# Patient Record
Sex: FEMALE | Race: WHITE | URBAN - METROPOLITAN AREA
[De-identification: names, ages, dates, MRNs, and addresses within clinical notes are randomized per-mention and may not be internally consistent; named-entity substitution may affect disease eponyms.]

---

## 2020-06-24 ENCOUNTER — TRANSFERRED RECORDS (OUTPATIENT)
Dept: HEALTH INFORMATION MANAGEMENT | Facility: CLINIC | Age: 60
End: 2020-06-24

## 2020-08-07 ENCOUNTER — VIRTUAL VISIT (OUTPATIENT)
Dept: FAMILY MEDICINE | Facility: OTHER | Age: 60
End: 2020-08-07

## 2020-08-07 NOTE — PROGRESS NOTES
"Date: 2020 08:37:52  Clinician: Azar Grimm  Clinician NPI: [id number]  Patient: Richard Stanley  Patient : 1960  Patient Address: [address] [place] [id number]  Patient Phone: [phone]  Visit Protocol: URI  Patient Summary:  Richard is a 60 year old ( : 1960 ) female who initiated a Visit for COVID-19 (Coronavirus) evaluation and screening. When asked the question \"Please sign me up to receive news, health information and promotions. \", Richard responded \"No\".    When asked when her symptoms started, Richard reported that she does not have any symptoms.   She denies taking antibiotic medication in the past month and having recent facial or sinus surgery in the past 60 days.    Pertinent COVID-19 (Coronavirus) information  In the past 14 days, Richard has not worked in a congregate living setting.   She does not work or volunteer as healthcare worker or a  and does not work or volunteer in a healthcare facility.   Richard also has not lived in a congregate living setting in the past 14 days. She does not live with a healthcare worker.   Richard has had a close contact with a laboratory-confirmed COVID-19 patient in the last 14 days. She was not exposed at her work. Additional information about contact with COVID-19 (Coronavirus) patient as reported by the patient (free text): Visit with an elderly relative in hospice care in [place] last week.  She was tested the day we visited and we found out yesterday that she tested positive for covid.    Patient reported they are not living in the same household with a COVID-19 positive patient.  Patient was in an enclosed space for greater than 15 minutes with a COVID-19 patient.  Since 2019, Richard and has not had upper respiratory infection or influenza-like illness. Has not been diagnosed with lab-confirmed COVID-19 test   Pertinent medical history  Richard does not get yeast infections when she takes antibiotics.   Richard does not need a " return to work/school note.   Weight: 149 lbs   Richard does not smoke or use smokeless tobacco.   Weight: 149 lbs    MEDICATIONS: No current medications, ALLERGIES: NKDA  Clinician Response:  Dear Richard,   Based on the details you've shared, you may have been exposed to coronavirus (COVID-19). You do not need to be tested at this time.  What should I do?  For safety, it's very important to follow these rules. Do this for 14 days after the date you were last exposed to the virus.  Stay home and away from others. Don't go to work, school or anywhere else.  No hugging, kissing or shaking hands.  Don't let anyone visit.  Cover your mouth and nose with a mask, tissue or washcloth to avoid spreading germs.  Wash your hands and face often. Use soap and water.  What are the symptoms of COVID-19?  The most common symptoms are cough, fever and trouble breathing. Less common symptoms include headache, body aches, fatigue (feeling very tired), chills, sore throat, stuffy or runny nose, diarrhea (loose poop), loss of taste or smell, belly pain, and nausea or vomiting (feeling sick to your stomach or throwing up).  After 14 days, if you have still don't have symptoms, you likely don't have this virus.  If you develop symptoms, follow these guidelines.  If you're normally healthy: Please start another [facility] visit to report your symptoms. Go to [email or web address].  If you have a serious health problem (like cancer, heart failure, an organ transplant or kidney disease): Call your specialty clinic. Let them know that you might have COVID-19.  Where can I get more information?    [facility] -- About COVID-19: [email or web address]  CDC -- What to Do If You're Sick: www.cdc.gov/coronavirus/2019-ncov/about/steps-when-sick.html  CDC -- Ending Home Isolation: www.cdc.gov/coronavirus/2019-ncov/hcp/disposition-in-home-patients.html  CDC -- Caring for Someone: www.cdc.gov/coronavirus/2019-ncov/if-you-are-sick/care-for-someone.html   [facility] -- Interim Guidance for Hospital Discharge to Home: www.health.[facility].mn.us/diseases/coronavirus/hcp/hospdischarge.pdf  [facility] clinical trials (COVID-19 research studies): clinicalaffairs.[facility]/n-clinical-trials  Below are the COVID-19 hotlines at the [place] Department of Health ([facility]). Interpreters are available.   For health questions: Call 334-745-6781 or 1-599.327.2675 (7 a.m. to 7 p.m.) For questions about schools and childcare: Call 096-471-0645 or 1-328.675.1158 (7 a.m. to 7 p.m.)     .      Diagnosis: Healthy person accompanying sick person  Diagnosis ICD: Z76.3

## 2020-10-22 ENCOUNTER — TRANSFERRED RECORDS (OUTPATIENT)
Dept: HEALTH INFORMATION MANAGEMENT | Facility: CLINIC | Age: 60
End: 2020-10-22

## 2021-06-22 ENCOUNTER — OFFICE VISIT (OUTPATIENT)
Dept: DERMATOLOGY | Facility: CLINIC | Age: 61
End: 2021-06-22
Payer: COMMERCIAL

## 2021-06-22 DIAGNOSIS — L73.8 SENILE SEBACEOUS GLAND HYPERPLASIA: ICD-10-CM

## 2021-06-22 DIAGNOSIS — D23.9 DERMATOFIBROMA: ICD-10-CM

## 2021-06-22 DIAGNOSIS — L57.8 SUN-DAMAGED SKIN: ICD-10-CM

## 2021-06-22 DIAGNOSIS — L82.1 SEBORRHEIC KERATOSIS: ICD-10-CM

## 2021-06-22 DIAGNOSIS — D22.9 MULTIPLE BENIGN NEVI: ICD-10-CM

## 2021-06-22 DIAGNOSIS — Z85.820 HISTORY OF MELANOMA: Primary | ICD-10-CM

## 2021-06-22 DIAGNOSIS — L81.4 SOLAR LENTIGO: ICD-10-CM

## 2021-06-22 DIAGNOSIS — D18.01 CHERRY ANGIOMA: ICD-10-CM

## 2021-06-22 DIAGNOSIS — Z86.018 HISTORY OF DYSPLASTIC NEVUS: ICD-10-CM

## 2021-06-22 PROCEDURE — 99203 OFFICE O/P NEW LOW 30 MIN: CPT | Performed by: DERMATOLOGY

## 2021-06-22 ASSESSMENT — PAIN SCALES - GENERAL: PAINLEVEL: NO PAIN (0)

## 2021-06-22 NOTE — LETTER
6/22/2021         RE: Richard Stanley  [address]  [facility] [address]        Dear Colleague,    Thank you for referring your patient, Richard Stanley, to the [facility]. Please see a copy of my visit note below.    [facility] Dermatology Note  Encounter Date: Jun 22, 2021  Office Visit     Dermatology Problem List:  1. History of melanoma x2 - records requested from [facility].  - Stage II, central superior abdomen, 2007. S/p negative SLNB from bilateral axillae and WLE.  - Stage I, right posterior calf, 2012, s/p excision.  2. History of many DN - records requested as above.    Social history: Retired . Has three grown children. 5 grandchildren.  Family history: Negative for melanoma and pancreatic cancer.  ____________________________________________    Assessment & Plan:     # Melanoma, stage II - abdomen in 2007 and stage I - right posterior calf in 2012. No evidence of recurrence. Discussed risk of melanoma recurrence (with local or distant disease) and risk of additional primary melanomas. Explained routine schedule for follow up examinations in office and need for self skin checks monthly. Discussed the use of total body photography (TBP) and digital dermoscopic documentation (DDD) to catch melanomas earlier. We reviewed that this is billed to insurance, but that it is unlikely to cover the costs associated with this. If not covered, the cost to the patient will be $205. Will hold off for now due to limitations in scheduling and consider next year at skin check.  - ABCDs of melanoma were discussed and self skin checks were advised.   - Sun precaution was advised including the use of sunscreens of SPF 30 or higher, sun protective clothing, and avoidance of tanning beds.  - Recommended yearly dental, gyn, and dilated ophthalmology examinations.  - Recommended first degree relatives get yearly skin exams as well.    # History of DN:  No evidence of recurrence. While these are benign, they are a marker for increased melanoma risk.  - Recommended yearly skin checks.    # Sun damaged skin with solar lentigines: Chronic, stable.  - Recommend sunscreens SPF #30 or greater, protective clothing and avoidance of tanning beds.    # Benign skin findings including: seborrheic keratoses, cherry angioma, dermatofibroma, sebaceous hyperplasia - minor, self limited problem  - No further intervention required. Patient to report changes.   - Patient reassured of the benign nature of these lesions.    # Multiple clinically benign nevi: Chronic, stable  - No further intervention required. Patient to report changes.   - Patient reassured of the benign nature of these lesions.    Procedures Performed:   None.    Follow-up: 1 year in-person for skin check, or earlier for new or changing lesions    Staff and Scribe:     Scribe Disclosure:   I, Shavon Herrmann, am serving as a scribe to document services personally performed by this physician, Dr. Sarah Beth Acosta, based on data collection and the provider's statements to me.     Provider Disclosure:   The documentation recorded by the scribe accurately reflects the services I personally performed and the decisions made by me.    Sarah Beth Acosta MD    Department of Dermatology  [facility]: Phone: [phone], Fax:[phone]  [facility]: Phone: [phone], Fax: [phone]    ____________________________________________    CC: Skin Check (Hx of Melanoma-center trunk stage 2 and stage back of right leg. No family hx of SC)    HPI:  Ms. iRchard Stanley is a(n) 61 year old female who presents today as a new patient for skin check.    She is new to our department. She has been seen at [facility]. She has a history of melanoma and numerous moles removed.    Self referred.    Today,  patient notes no concerns. She does report a history of melanoma x2 as detailed in dermatology problem list. Last skin check about one year ago.    Patient is otherwise feeling well, without additional skin concerns. Denies any fevers, chills, cough, shortness of breath, or weight loss.     Labs Reviewed:  N/A    Physical Exam:  Vitals: There were no vitals taken for this visit.  SKIN: Total skin excluding the undergarment areas was performed. The exam included the head/face, neck, both arms, chest, back, abdomen, buttocks, both legs, digits and/or nails.   - Tierney Type II  - Scattered brown macules on sun exposed areas.  - There are dome shaped bright red papules on the trunk.   - Multiple regular brown pigmented macules and papules are identified on the trunk and extremities.   - There are waxy stuck on tan to brown papules on the trunk.  - There are several biopsy scars scatter on the right back.  - There is a firm tan/flesh colored papule that dimples with lateral pressure on the left lower back.  - There are yellow oily papules with central umbilication located on the neck.  - Several circular biopsy scars on the chest and abdomen.  - There is a well healed linear scar on the superior central abdomen. No nodularity or pigment recurrence.  - There is a linear scar on the left medial thigh.  - There is a well healed linear scar on the right posterior calf. No nodularity or pigment recurrence.  - There is a linear scar on the central lower back. No pigment recurrence.  - No other lesions of concern on areas examined.   LYMPH NODES: No submandibular, cervical, supraclavicular, axillary, or inguinal lymphadenopathy palpable on examination.       Medications:  No current outpatient medications on file.     No current facility-administered medications for this visit.       Past Medical History:   There is no problem list on file for this patient.    No past medical history on file.    CC No referring provider  defined for this encounter. on close of this encounter.                Again, thank you for allowing me to participate in the care of your patient.        Sincerely,        Sarah Beth Acosta MD

## 2021-06-22 NOTE — PROGRESS NOTES
[facility] Dermatology Note  Encounter Date: Jun 22, 2021  Office Visit     Dermatology Problem List:  1. History of melanoma  - Stage II, central superior abdomen, 2007. Breslow depth 2.7mm. S/p negative SLNB from bilateral axillae and WLE.  - Stage I, right posterior calf, 2012, s/p excision. Breslow depth 0.4mm.   2. History DN  - Left proximal forearm, combined nevus with atypia and spitzoid features, s/p exc 11/2014  - Right triceps, moderate dysplasia, s/p biopsy 7/30/2012  - Left shin, compound melanocytic lesion with atypia and spitzoid feautres, exc 4/2012  - Right upper paraspinal, mildly dysplastic, bx 3/13/2012  - Below right breast, moderate atypia, s/p biopsy 2/10/2010  - Left lower back, mild atypia, bx 8/13/2008  - Left lateral breast, unknown degree of atypia, s/p exc 8/12/2008  3. History of benign biopsy  - Left axilla, compound nevus, bx 10/21/2013  - Right upper medial breast, dermal nevus, bx 3/13/2012  - Left proximal medial biceps, compound nevus, bx 8/16/2010  - Left great toe, acral nevus, bx 10/21/2008  - Right anterior shoulder, compound nevus, bx 8/13/2008  - Right upper axilla, compound nevus, bx 8/13/2008      Social history: Retired . Has three grown children. 5 grandchildren.  Family history: Negative for melanoma and pancreatic cancer.  ____________________________________________    Assessment & Plan:     # Melanoma, stage II - abdomen in 2007 and stage I - right posterior calf in 2012. No evidence of recurrence. Discussed risk of melanoma recurrence (with local or distant disease) and risk of additional primary melanomas. Explained routine schedule for follow up examinations in office and need for self skin checks monthly. Discussed the use of total body photography (TBP) and digital dermoscopic documentation (DDD) to catch melanomas earlier. We reviewed that this is billed to insurance, but that it is unlikely to cover the costs associated with  this. If not covered, the cost to the patient will be $205. Will hold off for now due to limitations in scheduling and consider next year at skin check.  - ABCDs of melanoma were discussed and self skin checks were advised.   - Sun precaution was advised including the use of sunscreens of SPF 30 or higher, sun protective clothing, and avoidance of tanning beds.  - Recommended yearly dental, gyn, and dilated ophthalmology examinations.  - Recommended first degree relatives get yearly skin exams as well.    # History of DN: No evidence of recurrence. While these are benign, they are a marker for increased melanoma risk.  - Recommended yearly skin checks.    # Sun damaged skin with solar lentigines: Chronic, stable.  - Recommend sunscreens SPF #30 or greater, protective clothing and avoidance of tanning beds.    # Benign skin findings including: seborrheic keratoses, cherry angioma, dermatofibroma, sebaceous hyperplasia - minor, self limited problem  - No further intervention required. Patient to report changes.   - Patient reassured of the benign nature of these lesions.    # Multiple clinically benign nevi: Chronic, stable  - No further intervention required. Patient to report changes.   - Patient reassured of the benign nature of these lesions.    Procedures Performed:   None.    Follow-up: 1 year in-person for skin check, or earlier for new or changing lesions    *Addendum:  Received records from [facility]. Updated DPL above.  Sarah Beth Acosta MD    Department of Dermatology  [facility]: Phone: [phone], Fax:[phone]  [facility]: Phone: [phone], Fax: [phone]      Staff and Scribe:     Scribe Disclosure:   Shavon DAVIDSON, am serving as a scribe to document services personally performed by this physician, Dr. Sarah Beth Acosta, based on data collection and the  provider's statements to me.     Provider Disclosure:   The documentation recorded by the scribe accurately reflects the services I personally performed and the decisions made by me.    Sarah Beth Acosta MD    Department of Dermatology  [facility]: Phone: [phone], Fax:[phone]  [facility]: Phone: [phone], Fax: [phone]    ____________________________________________    CC: Skin Check (Hx of Melanoma-center trunk stage 2 and stage back of right leg. No family hx of SC)    HPI:  Ms. Richard Stanley is a(n) 61 year old female who presents today as a new patient for skin check.    She is new to our department. She has been seen at [facility]. She has a history of melanoma and numerous moles removed.    Self referred.    Today, patient notes no concerns. She does report a history of melanoma x2 as detailed in dermatology problem list. Last skin check about one year ago.    Patient is otherwise feeling well, without additional skin concerns. Denies any fevers, chills, cough, shortness of breath, or weight loss.     Labs Reviewed:  N/A    Physical Exam:  Vitals: There were no vitals taken for this visit.  SKIN: Total skin excluding the undergarment areas was performed. The exam included the head/face, neck, both arms, chest, back, abdomen, buttocks, both legs, digits and/or nails.   - Tierney Type II  - Scattered brown macules on sun exposed areas.  - There are dome shaped bright red papules on the trunk.   - Multiple regular brown pigmented macules and papules are identified on the trunk and extremities.   - There are waxy stuck on tan to brown papules on the trunk.  - There are several biopsy scars scatter on the right back.  - There is a firm tan/flesh colored papule that dimples with lateral pressure on the left lower back.  - There are yellow oily papules with  central umbilication located on the neck.  - Several circular biopsy scars on the chest and abdomen.  - There is a well healed linear scar on the superior central abdomen. No nodularity or pigment recurrence.  - There is a linear scar on the left medial thigh.  - There is a well healed linear scar on the right posterior calf. No nodularity or pigment recurrence.  - There is a linear scar on the central lower back. No pigment recurrence.  - No other lesions of concern on areas examined.   LYMPH NODES: No submandibular, cervical, supraclavicular, axillary, or inguinal lymphadenopathy palpable on examination.       Medications:  No current outpatient medications on file.     No current facility-administered medications for this visit.       Past Medical History:   There is no problem list on file for this patient.    No past medical history on file.    CC No referring provider defined for this encounter. on close of this encounter.

## 2021-06-22 NOTE — NURSING NOTE
Richard Stanley's goals for this visit include:   Chief Complaint   Patient presents with     Skin Check     Hx of Melanoma-center trunk stage 2 and stage back of right leg. No family hx of SC       She requests these members of her care team be copied on today's visit information:     PCP: No Ref-Primary, Physician    Referring Provider:  No referring provider defined for this encounter.    There were no vitals taken for this visit.    Do you need any medication refills at today's visit? Kanika Marcum on 6/22/2021 at 9:16 AM